# Patient Record
Sex: FEMALE | Race: WHITE | NOT HISPANIC OR LATINO | Employment: OTHER | URBAN - METROPOLITAN AREA
[De-identification: names, ages, dates, MRNs, and addresses within clinical notes are randomized per-mention and may not be internally consistent; named-entity substitution may affect disease eponyms.]

---
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
## 2020-01-13 ENCOUNTER — TELEPHONE (OUTPATIENT)
Dept: FAMILY MEDICINE CLINIC | Facility: CLINIC | Age: 20
End: 2020-01-13

## 2020-01-13 NOTE — TELEPHONE ENCOUNTER
Alprazolam 0 25mg  last filled 12/23/19, #30 per PDMP  Mom states that she has been giving her 1 1/2 tab prior to school each day, and 2 tabs on every Wed  She is at a new school and the school does field trips weekly and Adline Son has a lot anxiety about that  Mom says its the only way to get her through her days  Asking if script amount can be adjusted to reflect this? Will need new script

## 2020-01-13 NOTE — TELEPHONE ENCOUNTER
Mom said that she gives it to her every morning  If they go away on weekends she needs it in order to be out in the community

## 2020-01-14 DIAGNOSIS — F41.1 GENERALIZED ANXIETY DISORDER: ICD-10-CM

## 2020-01-14 RX ORDER — ALPRAZOLAM 0.25 MG/1
0.25 TABLET ORAL 2 TIMES DAILY PRN
Qty: 50 TABLET | Refills: 1 | Status: SHIPPED | OUTPATIENT
Start: 2020-01-14 | End: 2020-01-16 | Stop reason: SDUPTHER

## 2020-01-16 DIAGNOSIS — F41.1 GENERALIZED ANXIETY DISORDER: ICD-10-CM

## 2020-01-16 RX ORDER — ALPRAZOLAM 0.25 MG/1
0.25 TABLET ORAL 2 TIMES DAILY PRN
Qty: 50 TABLET | Refills: 1 | Status: SHIPPED | OUTPATIENT
Start: 2020-01-16 | End: 2020-04-10 | Stop reason: SDUPTHER

## 2020-01-16 NOTE — TELEPHONE ENCOUNTER
Pt mother wants to use a different pharmacy closer to her house  I called and cancelled other script    PDMP 12/23

## 2020-02-12 ENCOUNTER — TELEPHONE (OUTPATIENT)
Dept: FAMILY MEDICINE CLINIC | Facility: CLINIC | Age: 20
End: 2020-02-12

## 2020-02-12 NOTE — TELEPHONE ENCOUNTER
Pt mother called and needs a note faxed to Elke Mejia at [facility] in [facility] at [phone] in regards to approving general anesthesia for treatment (cleaning, x-ray, cavities) due to her underlying disabilities  In the past they have tried valium, laughing gas, restraints and nothing has worked  Please advise if I can proceed

## 2020-02-13 ENCOUNTER — DOCUMENTATION (OUTPATIENT)
Dept: FAMILY MEDICINE CLINIC | Facility: CLINIC | Age: 20
End: 2020-02-13

## 2020-03-13 ENCOUNTER — TELEPHONE (OUTPATIENT)
Dept: FAMILY MEDICINE CLINIC | Facility: CLINIC | Age: 20
End: 2020-03-13

## 2020-03-13 DIAGNOSIS — F51.01 PRIMARY INSOMNIA: ICD-10-CM

## 2020-03-13 RX ORDER — CLONIDINE HYDROCHLORIDE 0.1 MG/1
0.1 TABLET ORAL DAILY
Qty: 30 TABLET | Refills: 0 | Status: SHIPPED | OUTPATIENT
Start: 2020-03-13 | End: 2020-04-10 | Stop reason: SDUPTHER

## 2020-03-13 NOTE — TELEPHONE ENCOUNTER
Pt mother called and lefty vm in regards to finding a new doctor closer to their house for their entire family, although they do not have appts until the end of the month  Mother was wondering if you would refill pts clonidine for her in the meantime

## 2020-03-13 NOTE — TELEPHONE ENCOUNTER
Okay  I filled it  Can we remove myself from the PCP for the family then and let whoever know that we need to?

## 2020-03-16 ENCOUNTER — TELEPHONE (OUTPATIENT)
Dept: FAMILY MEDICINE CLINIC | Facility: CLINIC | Age: 20
End: 2020-03-16

## 2020-03-29 DIAGNOSIS — F90.1 ATTENTION DEFICIT HYPERACTIVITY DISORDER (ADHD), PREDOMINANTLY HYPERACTIVE TYPE: ICD-10-CM

## 2020-03-30 RX ORDER — GUANFACINE 2 MG/1
TABLET, EXTENDED RELEASE ORAL
Qty: 30 TABLET | Refills: 1 | OUTPATIENT
Start: 2020-03-30

## 2020-04-06 DIAGNOSIS — F51.01 PRIMARY INSOMNIA: ICD-10-CM

## 2020-04-06 RX ORDER — CLONIDINE HYDROCHLORIDE 0.1 MG/1
TABLET ORAL
Qty: 30 TABLET | Refills: 0 | OUTPATIENT
Start: 2020-04-06

## 2020-04-09 DIAGNOSIS — F90.1 ATTENTION DEFICIT HYPERACTIVITY DISORDER (ADHD), PREDOMINANTLY HYPERACTIVE TYPE: ICD-10-CM

## 2020-04-09 DIAGNOSIS — F51.01 PRIMARY INSOMNIA: ICD-10-CM

## 2020-04-09 DIAGNOSIS — F41.1 GENERALIZED ANXIETY DISORDER: ICD-10-CM

## 2020-04-10 RX ORDER — GUANFACINE 2 MG/1
TABLET, EXTENDED RELEASE ORAL
Qty: 30 TABLET | Refills: 0 | Status: SHIPPED | OUTPATIENT
Start: 2020-04-10

## 2020-04-10 RX ORDER — ALPRAZOLAM 0.25 MG/1
0.25 TABLET ORAL 2 TIMES DAILY PRN
Qty: 50 TABLET | Refills: 1 | Status: SHIPPED | OUTPATIENT
Start: 2020-04-10

## 2020-04-10 RX ORDER — CLONIDINE HYDROCHLORIDE 0.1 MG/1
0.1 TABLET ORAL DAILY
Qty: 30 TABLET | Refills: 0 | Status: SHIPPED | OUTPATIENT
Start: 2020-04-10

## 2020-05-04 DIAGNOSIS — F51.01 PRIMARY INSOMNIA: ICD-10-CM

## 2020-05-04 DIAGNOSIS — F90.1 ATTENTION DEFICIT HYPERACTIVITY DISORDER (ADHD), PREDOMINANTLY HYPERACTIVE TYPE: ICD-10-CM

## 2020-05-04 RX ORDER — CLONIDINE HYDROCHLORIDE 0.1 MG/1
TABLET ORAL
Qty: 30 TABLET | Refills: 0 | OUTPATIENT
Start: 2020-05-04

## 2020-05-04 RX ORDER — GUANFACINE 2 MG/1
TABLET, EXTENDED RELEASE ORAL
Qty: 30 TABLET | Refills: 0 | OUTPATIENT
Start: 2020-05-04

## 2020-05-11 DIAGNOSIS — F51.01 PRIMARY INSOMNIA: ICD-10-CM

## 2020-05-27 RX ORDER — CLONIDINE HYDROCHLORIDE 0.1 MG/1
TABLET ORAL
Qty: 30 TABLET | Refills: 0 | OUTPATIENT
Start: 2020-05-27

## 2024-09-18 ENCOUNTER — CONSULT (OUTPATIENT)
Dept: GASTROENTEROLOGY | Facility: CLINIC | Age: 24
End: 2024-09-18
Payer: COMMERCIAL

## 2024-09-18 VITALS
SYSTOLIC BLOOD PRESSURE: 122 MMHG | DIASTOLIC BLOOD PRESSURE: 80 MMHG | WEIGHT: 177 LBS | HEIGHT: 62 IN | BODY MASS INDEX: 32.57 KG/M2

## 2024-09-18 DIAGNOSIS — K59.04 CHRONIC IDIOPATHIC CONSTIPATION: ICD-10-CM

## 2024-09-18 DIAGNOSIS — K76.0 NAFLD (NONALCOHOLIC FATTY LIVER DISEASE): Primary | ICD-10-CM

## 2024-09-18 PROCEDURE — 99204 OFFICE O/P NEW MOD 45 MIN: CPT | Performed by: STUDENT IN AN ORGANIZED HEALTH CARE EDUCATION/TRAINING PROGRAM

## 2024-09-18 RX ORDER — CITALOPRAM HYDROBROMIDE 20 MG/1
TABLET ORAL
COMMUNITY

## 2024-09-18 RX ORDER — CITALOPRAM HYDROBROMIDE 10 MG/1
10 TABLET ORAL DAILY
COMMUNITY
Start: 2024-07-18

## 2024-09-18 RX ORDER — LUBIPROSTONE 24 UG/1
24 CAPSULE ORAL 2 TIMES DAILY WITH MEALS
Qty: 180 CAPSULE | Refills: 0 | Status: SHIPPED | OUTPATIENT
Start: 2024-09-18 | End: 2024-12-17

## 2024-09-18 RX ORDER — METFORMIN HCL 500 MG
TABLET, EXTENDED RELEASE 24 HR ORAL
COMMUNITY
Start: 2024-08-22

## 2024-09-18 RX ORDER — LEVOTHYROXINE SODIUM 25 UG/1
37.5 TABLET ORAL
COMMUNITY
Start: 2024-08-01

## 2024-09-18 RX ORDER — OMEGA-3-ACID ETHYL ESTERS 1 G/1
1 CAPSULE, LIQUID FILLED ORAL 2 TIMES DAILY
COMMUNITY
Start: 2024-07-01

## 2024-09-18 NOTE — PROGRESS NOTES
Consultation - [facility]     Christal Sanchez 23 y.o. female MRN: [id number]  Unit/Bed#:  Encounter: [id number]    Consults    ASSESSMENT and PLAN    1. NAFLD (nonalcoholic fatty liver disease)  Hx MASLD consistent with other issues including PCOS and BMI 32. Will get repeat US now. She may need sedation and advised to discuss with radiology if this is possible and any protocols they have for it. If need be, I can prescribe something per their recommendation. Labs every 6 months, next in December.  - US right upper quadrant; Future    2. Chronic idiopathic constipation  Start amitiza BID. Reassess after 90 days.  - lubiprostone (AMITIZA) 24 mcg capsule; Take 1 capsule (24 mcg total) by mouth 2 (two) times a day with meals  Dispense: 180 capsule; Refill: 0      Chief Complaint   Patient presents with    Elevated Hepatic Enzymes     Pt has fatty liver and her LFT's came back abnormal, pt also has constipation         HPI  Christal Sanchez is a 23 y.o. year old female with a past medical history of autism, PCOS who presents for history of nonalcoholic fatty liver disease and chronic constipation.  She was followed previously at [facility] for her fatty liver disease and was monitored with labs.  Her most recent ALT was mildly elevated at 56.  She has not had a recent ultrasound and has needed sedation for all procedures and imaging in the past.  She has a history of chronic constipation and can go 4 to 5 days without BM despite multiple OTC medications including MiraLAX, stool softeners, milk of magnesia, laxatives. She will have a large solid bowel movement after several days and intermittent 'explosions'.           Historical Information   Past Medical History:   Diagnosis Date    Allergic     Anxiety     Constipation     Disease of thyroid gland     Fatty liver     Hypothyroid     PCOS (polycystic ovarian syndrome)     Skin disease      Past Surgical History:   Procedure Laterality Date    WRIST FRACTURE  "SURGERY       Social History   Social History     Substance and Sexual Activity   Alcohol Use Never     Social History     Substance and Sexual Activity   Drug Use Never     Social History     Tobacco Use   Smoking Status Never   Smokeless Tobacco Never     Family History   Problem Relation Age of Onset    No Known Problems Mother     No Known Problems Father     Coronary artery disease Maternal Grandmother     Hypertension Maternal Grandmother     Rheumatic fever Maternal Grandmother     Cancer Maternal Grandmother         skin cancer    Coronary artery disease Maternal Grandfather     Hypertension Maternal Grandfather     Cancer Maternal Grandfather         skin cancer    Hypertension Paternal Grandmother     Stroke Paternal Grandfather     Hypertension Paternal Grandfather        Meds/Allergies     No current facility-administered medications for this visit.  Not in a hospital admission.    No Known Allergies    PHYSICAL EXAM    Visit Vitals  /80   Ht 5' 2\" (1.575 m)   Wt 80.3 kg (177 lb)   BMI 32.37 kg/m²   Smoking Status Never   BSA 1.82 m²     Body mass index is 32.37 kg/m².  General Appearance: NAD, cooperative, alert  Eyes: Anicteric, EOMI  ENT: Normocephalic, atraumatic, normal mucosa  Neck: symmetrical, trachea midline  Lungs: clear to auscultation, non-labored respirations on room air, no wheezing  CV: S1 S2+ radial pulses bilaterally  GI:  Soft, non-tender, non-distended; normal bowel sounds; no masses, no organomegaly   Rectal: Deferred  Musculoskeletal: No gross deformities or pitting edema  Skin:  No jaundice, no rashes  Neurologic: awake, alert, oriented, no gross deficits    Lab Results   Component Value Date    CALCIUM 9.5 10/02/2019    K 3.9 10/02/2019    CO2 26 10/02/2019     10/02/2019    BUN 11 10/02/2019    CREATININE 0.88 10/02/2019     No results found for: \"WBC\", \"HGB\", \"MCV\", \"PLT\"  Lab Results   Component Value Date    ALT 41 10/02/2019    AST 21 10/02/2019    ALKPHOS 95 " "10/02/2019    TBILI 0.54 10/02/2019     No results found for: \"AMYLASE\"  No results found for: \"LIPASE\"  No results found for: \"IRON\", \"TIBC\", \"FERRITIN\"  No results found for: \"INR\"    No results found.    Imaging Studies: I have personally reviewed pertinent reports.      Pathology, and Other Studies: I have personally reviewed pertinent reports.      REVIEW OF SYSTEMS    CONSTITUTIONAL: negative unless stated in HPI  GASTROINTESTINAL: As noted in the HPI        "

## 2024-09-25 ENCOUNTER — NURSE TRIAGE (OUTPATIENT)
Age: 24
End: 2024-09-25

## 2024-09-25 NOTE — TELEPHONE ENCOUNTER
"Consult 9/18/24 Dr. Garibay   Next OV 12/12/24  PMH Chronic idiopathic constipation and NAFLD    Pt's Mother Melita reports pt has been taking Amitiza 24 mcg BID since OV. No improvement noted. Had \"explosion Sunday\"; mostly diarrhea. No BM since then. Common for pt to go several days without BM. States she stopped all OTC's and only has pt on Amitiza and Fiber One bars.     Pt non verbal. Mom denies abdominal pain, swelling, nausea, vomiting.     She will restart Miralax (titrating to desire effect) and stool softener.    Please advise if you have additional recommendations.    Mom [phone]      Reason for Disposition   MILD constipation    Answer Assessment - Initial Assessment Questions  1. STOOL PATTERN OR FREQUENCY: \"How often do you pass bowel movements (BMs)?\"  (Normal range: tid to q 3 days)  \"When was the last BM passed?\"        4 to 5 days  5. BLOOD ON STOOLS: \"Has there been any blood on the toilet tissue or on the surface of the BM?\" If Yes, ask: \"When was the last time?\"       No  6. CHRONIC CONSTIPATION: \"Is this a new problem for you?\"  If no, ask: \"How long have you had this problem?\" (days, weeks, months)       Yes  9. LAXATIVES: \"Have you been using any stool softeners, laxatives, or enemas?\"  If yes, ask \"What, how often, and when was the last time?\"     None  11. CAUSE: \"What do you think is causing the constipation?\"         Chronic  12. OTHER SYMPTOMS: \"Do you have any other symptoms?\" (e.g., abdominal pain, bloating, fever, vomiting)        Denies    Protocols used: Constipation-ADULT-[place]    "

## 2024-10-07 LAB — HBA1C MFR BLD HPLC: 5.2 %

## 2024-12-15 DIAGNOSIS — K59.04 CHRONIC IDIOPATHIC CONSTIPATION: ICD-10-CM

## 2024-12-16 RX ORDER — LUBIPROSTONE 24 UG/1
CAPSULE ORAL
Qty: 180 CAPSULE | Refills: 1 | Status: SHIPPED | OUTPATIENT
Start: 2024-12-16

## 2025-01-28 ENCOUNTER — TELEPHONE (OUTPATIENT)
Age: 25
End: 2025-01-28

## 2025-01-28 LAB — HBA1C MFR BLD HPLC: 5.2 %

## 2025-01-28 NOTE — TELEPHONE ENCOUNTER
Pt's mother called. Recevied letter form [facility] dated 1/2/25 stating CT with anesthesia denied. Missing clinicals and past imaging. Letter in media.    She is requesting a call at [phone]

## 2025-01-29 NOTE — TELEPHONE ENCOUNTER
Routing to Dr. Garibay before mailing order to confirm still appropriate to proceed with CT scan since it was ordered September 2024.

## 2025-02-07 NOTE — TELEPHONE ENCOUNTER
Mother Lea (consent verified) calling to speak with PEC team regarding PA for CT.  She spoke to [place] and has information that they will need in order to get this approved.  Would like call back ASAP.

## 2025-02-07 NOTE — TELEPHONE ENCOUNTER
Pts mother Melita on the consent called to schedule CT scan advised she need to call Central Scheduling was giving her ph # she said she will be scheduling at [facility] and she requested to speak to Jammie sent message advised she is busy with other pt she can call her back later on said ok also advised she has some insurance issues so she need to check with insurance co for any insurance or coverage questions. Said let she get a call from Jammie then will do.

## 2025-02-24 ENCOUNTER — TELEPHONE (OUTPATIENT)
Age: 25
End: 2025-02-24

## 2025-02-24 NOTE — TELEPHONE ENCOUNTER
Pt's mom eLa returning our call re: rs pt's ov today 2/24 at 11:40 am w/ Dr. Garibay due to provider is ill. Pt's mom rs pt w/ Dr. Garibay for tomorrow 2/25 9:40 am w/ Dr. Garibay at [place] office. She is aware that this appt is depending on how doctor is feeling and could possibly be rs. I also called [place] office today and informed [facility] that Lea aware of potential rs tomorrow.

## 2025-02-25 ENCOUNTER — OFFICE VISIT (OUTPATIENT)
Dept: GASTROENTEROLOGY | Facility: CLINIC | Age: 25
End: 2025-02-25
Payer: COMMERCIAL

## 2025-02-25 VITALS — SYSTOLIC BLOOD PRESSURE: 116 MMHG | DIASTOLIC BLOOD PRESSURE: 66 MMHG

## 2025-02-25 DIAGNOSIS — K76.0 NAFLD (NONALCOHOLIC FATTY LIVER DISEASE): Primary | ICD-10-CM

## 2025-02-25 DIAGNOSIS — R79.89 ABNORMAL LFTS: ICD-10-CM

## 2025-02-25 DIAGNOSIS — K59.09 CHRONIC CONSTIPATION: ICD-10-CM

## 2025-02-25 DIAGNOSIS — R10.11 RUQ DISCOMFORT: ICD-10-CM

## 2025-02-25 PROCEDURE — 99214 OFFICE O/P EST MOD 30 MIN: CPT | Performed by: STUDENT IN AN ORGANIZED HEALTH CARE EDUCATION/TRAINING PROGRAM

## 2025-02-25 NOTE — PROGRESS NOTES
Name: Christal Sanchez      : 2000      MRN: [id number]  Encounter Provider: Patrick Garibay DO  Encounter Date: 2025   Encounter department: [facility]  :  Assessment & Plan  NAFLD (nonalcoholic fatty liver disease)  History of NAFLD with risk factors including BMI 32.37, hyperlipidemia. AST/ALT remain mildly elevated with normal bilirubin. No GI contraindication to other medications such as statins if necessary. Given her constipation, would hold off on GLP-1.  Orders:    CT abdomen pelvis w contrast; Future    RUQ discomfort  She does have some grimacing with deep palpation of the RUQ. She is non-verbal, so it is difficult to ascertain any significant pain. She has had imaging with sedation previously at [facility]. Given this finding and her history of liver issues, I believe a CT scan is prudent. We will order to be done with conscious sedation.  Orders:    CT abdomen pelvis w contrast; Future    Chronic constipation  Continue present regimen. Call as needed for Amitiza refills.       Abnormal LFTs  Likely from NAFLD given risk factors as above. Continue lifestyle modifications. Rezdiffra can be considered in the future for any progression, though they would like to try and avoid medications as long as possible.           History of Present Illness   Christal Sanchez is a 24 y.o. female with pmhx autism (non-verbal), PCOS, NAFLD who presents for evaluation of abnormal LFTs. She had labs on 2025 notable for total cholesterol 213, HDL 39, triglyceride 331, , AST 39, ALT 71, total bilirubin 0.5. ALT has gone from 56 to 71 since her last re-check. She is on an aggressive bowel regimen including Amitiza and MiraLAX and goes regularly, but they are small, hard stools. She does grimace when you push on her right upper quadrant but since she is non-verbal she cannot give any specifics as to whether or not there is any significant pain.    HPI  History obtained from: patient's Legal Guardian  Review of Systems A complete review of systems is negative other than that noted above in the HPI.    Current Outpatient Medications on File Prior to Visit   Medication Sig Dispense Refill    citalopram (CeleXA) 10 mg tablet Take 10 mg by mouth daily      citalopram (CeleXA) 20 mg tablet       cloNIDine (CATAPRES) 0.1 mg tablet Take 1 tablet (0.1 mg total) by mouth daily 30 tablet 0    guanFACINE HCl ER 2 MG TB24 TAKE 1 TABLET BY MOUTH EVERY DAY 30 tablet 0    levothyroxine 25 mcg tablet 37.5 mcg      lubiprostone (AMITIZA) 24 mcg capsule TAKE 1 CAPSULE BY MOUTH TWICE DAILY WITH MEALS 180 capsule 1    metFORMIN (GLUCOPHAGE-XR) 500 mg 24 hr tablet TAKE 2 TABLETS (1000MG) BY MOUTH TWICE A DAY.      omega-3-acid ethyl esters (LOVAZA) 1 g capsule Take 1 g by mouth 2 (two) times a day      polyethylene glycol (GLYCOLAX) powder Take 17 g by mouth daily 30 g 5    ALPRAZolam (XANAX) 0.25 mg tablet Take 1 tablet (0.25 mg total) by mouth 2 (two) times a day as needed for anxiety 50 tablet 1    CANNABIDIOL PO Take by mouth as needed       ergocalciferol (VITAMIN D2) 50,000 units TAKE 1 CAPSULE BY MOUTH ONE TIME PER WEEK 12 capsule 0    Melatonin 3 MG TBDP Take by mouth      triazolam (HALCION) 0.125 MG tablet Take 1 tablet (125 mcg total) by mouth daily at bedtime as needed for sleep 1 tablet 0     No current facility-administered medications on file prior to visit.      Social History     Tobacco Use    Smoking status: Never    Smokeless tobacco: Never   Vaping Use    Vaping status: Never Used   Substance and Sexual Activity    Alcohol use: Never    Drug use: Never    Sexual activity: Never     Current Outpatient Medications   Medication Sig Dispense Refill    citalopram (CeleXA) 10 mg tablet Take 10 mg by mouth daily      citalopram (CeleXA) 20 mg tablet       cloNIDine (CATAPRES) 0.1 mg tablet Take 1 tablet (0.1 mg total) by mouth daily 30 tablet 0    guanFACINE HCl ER  2 MG TB24 TAKE 1 TABLET BY MOUTH EVERY DAY 30 tablet 0    levothyroxine 25 mcg tablet 37.5 mcg      lubiprostone (AMITIZA) 24 mcg capsule TAKE 1 CAPSULE BY MOUTH TWICE DAILY WITH MEALS 180 capsule 1    metFORMIN (GLUCOPHAGE-XR) 500 mg 24 hr tablet TAKE 2 TABLETS (1000MG) BY MOUTH TWICE A DAY.      omega-3-acid ethyl esters (LOVAZA) 1 g capsule Take 1 g by mouth 2 (two) times a day      polyethylene glycol (GLYCOLAX) powder Take 17 g by mouth daily 30 g 5    ALPRAZolam (XANAX) 0.25 mg tablet Take 1 tablet (0.25 mg total) by mouth 2 (two) times a day as needed for anxiety 50 tablet 1    CANNABIDIOL PO Take by mouth as needed       ergocalciferol (VITAMIN D2) 50,000 units TAKE 1 CAPSULE BY MOUTH ONE TIME PER WEEK 12 capsule 0    Melatonin 3 MG TBDP Take by mouth      triazolam (HALCION) 0.125 MG tablet Take 1 tablet (125 mcg total) by mouth daily at bedtime as needed for sleep 1 tablet 0     No current facility-administered medications for this visit.     Objective   /66     Physical Exam   General Appearance: NAD, cooperative, alert  Eyes: Anicteric  GI: mild gramacing with RUQ deep palpation; no rebound or guarding  Rectal: Deferred  Musculoskeletal: No edema.  Skin:     No jaundice        Lab Results: I personally reviewed relevant lab results.

## 2025-02-25 NOTE — ASSESSMENT & PLAN NOTE
History of NAFLD with risk factors including BMI 32.37, hyperlipidemia. AST/ALT remain mildly elevated with normal bilirubin. No GI contraindication to other medications such as statins if necessary. Given her constipation, would hold off on GLP-1.  Orders:    CT abdomen pelvis w contrast; Future

## 2025-02-26 ENCOUNTER — NURSE TRIAGE (OUTPATIENT)
Age: 25
End: 2025-02-26

## 2025-02-26 NOTE — TELEPHONE ENCOUNTER
"Spoke with pt ladi Hua, calling to inform [phone], told that sedation is only done with mri. Wants to know what your preferred imaging would be. Concerned as pt has sedation for all testing, dentist, gyn exam etc. Can this be further investigated as to what imaging can be done with sedation?          Answer Assessment - Initial Assessment Questions  1. REASON FOR CALL: \"What is the main reason for your call?\" or \"How can I best help you?\"      SPOKE WITH PT LADI HUA, CALLING TO INFORM [phone], TOLD THAT SEDATION IS ONLY DONE WITH MRI. WANTS TO KNOW WHAT YOUR PREFERRED IMAGING WOULD BE. CONCERNED AS PT HAS SEDATION FOR ALL TESTING, DENTIST, GYN EXAM ETC. CAN THIS BE FURTHER INVESTIGATED AS TO WHAT IMAGING CAN BE DONE WITH SEDATION.    Protocols used: Information Only Call - No Triage-Adult-[place]    "

## 2025-02-28 NOTE — TELEPHONE ENCOUNTER
Mother calling regarding sedation for testing.  Mother would like doctor to call [facility] regarding procedure and let them know that it needs to be done with sedation.  Wants to let us know that on 3/24/23 patient had US under anesthesia so it is possible.

## 2025-03-03 NOTE — TELEPHONE ENCOUNTER
I spoke with Central Scheduling at [facility].  They state the order for the CT scan should include that patient needs anesthesia.

## 2025-03-04 NOTE — TELEPHONE ENCOUNTER
Spoke with mother.  Will fax CT order to [facility] and send to her through [employer].

## 2025-03-17 ENCOUNTER — TELEPHONE (OUTPATIENT)
Age: 25
End: 2025-03-17

## 2025-03-17 NOTE — TELEPHONE ENCOUNTER
I spoke with Mrs. Sanchez and reviewed PA department note, that request is in process and they will update her.

## 2025-03-17 NOTE — TELEPHONE ENCOUNTER
Mother calling back to see if CT had been approved.  Let her know we are working on it and will reach out to her as soon as we have more information.

## 2025-03-17 NOTE — TELEPHONE ENCOUNTER
Pts mother Melita calling in to see if Ct was authorized yet, pt has appt Wednesday. Please call pts mother back.

## 2025-03-18 NOTE — TELEPHONE ENCOUNTER
Will defer to Dr. Garibay who returns tomorrow. Looks like his note says to be done with conscious sedation, not general anesthesia.

## 2025-03-18 NOTE — TELEPHONE ENCOUNTER
I called Mrs. Sanchez to update that primary insurance did not approve CT at [facility] since it is a non participating facility with the [employer] plan. I asked that she call back to update if she knows capitation site with Christal's insurance plan.    Could clinical staff contact [employer] to check on which facility patient participates with patient's plan?

## 2025-03-18 NOTE — TELEPHONE ENCOUNTER
Mother called. Cancelling at [facility] as not covered. Requesting procedure be done at [facility] per insurance. Wants to be sure it can be done under anesthesia at that location. They opened a new case file # [id number]. Reji requires clinicals.     She is requesting a call to discuss next steps.

## 2025-03-24 NOTE — TELEPHONE ENCOUNTER
[employer] message sent to patient's Mom to please schedule CT and discuss with central scheduling the sedation requirements.  Asked to let us know when she is scheduled so PA can be completed.

## 2025-03-31 ENCOUNTER — TELEPHONE (OUTPATIENT)
Dept: GASTROENTEROLOGY | Facility: CLINIC | Age: 25
End: 2025-03-31

## 2025-03-31 NOTE — TELEPHONE ENCOUNTER
From: Joceline Alarcon MA  Sent: 3/25/2025   9:05 AM EDT  To: [facility]  Subject: alternative recommendation                        Hi all --        The insruance for this patient has denied the request for ct ab pel w contrast, instead they are offering to approve ct abdomen only with contrast - please advise if this is acceptable, and if not I can send for formal p2p        Thanks

## 2025-04-01 DIAGNOSIS — R10.11 RUQ DISCOMFORT: ICD-10-CM

## 2025-04-01 DIAGNOSIS — K76.0 NAFLD (NONALCOHOLIC FATTY LIVER DISEASE): Primary | ICD-10-CM

## 2025-04-01 NOTE — TELEPHONE ENCOUNTER
Dr. Garibay did place new order for CT abdomen with contrast as requested. Patient currently scheduled 4/11/25 at [facility] noting anesthesia on notes.

## 2025-04-10 ENCOUNTER — ANESTHESIA EVENT (OUTPATIENT)
Dept: RADIOLOGY | Facility: HOSPITAL | Age: 25
End: 2025-04-10
Payer: COMMERCIAL

## 2025-04-10 NOTE — ANESTHESIA PREPROCEDURE EVALUATION
Procedure:  CT ABDOMEN W CONTRAST    Relevant Problems   CARDIO   (+) Hypertriglyceridemia      GI/HEPATIC   (+) NAFLD (nonalcoholic fatty liver disease)      Behavioral Health   (+) Autistic disorder      Severe non-verbal autism    CMP: WNL    Physical Exam    Airway    Mallampati score: unable to assess         Dental       Cardiovascular      Pulmonary      Other Findings  post-pubertal.    Anesthesia Plan  ASA Score- 3     Anesthesia Type-     Plan Factors-    Chart reviewed.   Existing labs reviewed. Patient summary reviewed.    Patient is not a current smoker.  Patient did not smoke on day of surgery.            Induction- intravenous.    Postoperative Plan-     Perioperative Resuscitation Plan - Level 1 - Full Code.       Informed Consent- Anesthetic plan and risks discussed with mother and father.  I personally reviewed this patient with the CRNA. Discussed and agreed on the Anesthesia Plan with the CRNA..      NPO Status:  No vitals data found for the desired time range.

## 2025-04-11 ENCOUNTER — HOSPITAL ENCOUNTER (OUTPATIENT)
Dept: RADIOLOGY | Facility: HOSPITAL | Age: 25
Discharge: HOME | End: 2025-04-11
Attending: STUDENT IN AN ORGANIZED HEALTH CARE EDUCATION/TRAINING PROGRAM
Payer: COMMERCIAL

## 2025-04-11 ENCOUNTER — ANESTHESIA (OUTPATIENT)
Dept: RADIOLOGY | Facility: HOSPITAL | Age: 25
End: 2025-04-11
Payer: COMMERCIAL

## 2025-04-11 VITALS
RESPIRATION RATE: 18 BRPM | SYSTOLIC BLOOD PRESSURE: 119 MMHG | TEMPERATURE: 96.4 F | OXYGEN SATURATION: 95 % | HEART RATE: 101 BPM | DIASTOLIC BLOOD PRESSURE: 80 MMHG

## 2025-04-11 DIAGNOSIS — R10.11 RUQ DISCOMFORT: ICD-10-CM

## 2025-04-11 DIAGNOSIS — K76.0 NAFLD (NONALCOHOLIC FATTY LIVER DISEASE): ICD-10-CM

## 2025-04-11 LAB
EXT PREGNANCY TEST URINE: NEGATIVE
EXT. CONTROL: NORMAL

## 2025-04-11 PROCEDURE — 74160 CT ABDOMEN W/CONTRAST: CPT

## 2025-04-11 PROCEDURE — 81025 URINE PREGNANCY TEST: CPT | Performed by: ANESTHESIOLOGY

## 2025-04-11 RX ORDER — SODIUM CHLORIDE, SODIUM LACTATE, POTASSIUM CHLORIDE, CALCIUM CHLORIDE 600; 310; 30; 20 MG/100ML; MG/100ML; MG/100ML; MG/100ML
INJECTION, SOLUTION INTRAVENOUS CONTINUOUS PRN
Status: DISCONTINUED | OUTPATIENT
Start: 2025-04-11 | End: 2025-04-11

## 2025-04-11 RX ORDER — MIDAZOLAM HYDROCHLORIDE 2 MG/2ML
INJECTION, SOLUTION INTRAMUSCULAR; INTRAVENOUS AS NEEDED
Status: DISCONTINUED | OUTPATIENT
Start: 2025-04-11 | End: 2025-04-11

## 2025-04-11 RX ORDER — ONDANSETRON 2 MG/ML
4 INJECTION INTRAMUSCULAR; INTRAVENOUS ONCE AS NEEDED
Status: DISCONTINUED | OUTPATIENT
Start: 2025-04-11 | End: 2025-04-12 | Stop reason: HOSPADM

## 2025-04-11 RX ORDER — PROPOFOL 10 MG/ML
INJECTION, EMULSION INTRAVENOUS AS NEEDED
Status: DISCONTINUED | OUTPATIENT
Start: 2025-04-11 | End: 2025-04-11

## 2025-04-11 RX ORDER — GLYCOPYRROLATE 0.2 MG/ML
INJECTION INTRAMUSCULAR; INTRAVENOUS AS NEEDED
Status: DISCONTINUED | OUTPATIENT
Start: 2025-04-11 | End: 2025-04-11

## 2025-04-11 RX ORDER — ONDANSETRON 4 MG/1
TABLET, ORALLY DISINTEGRATING ORAL
Status: COMPLETED
Start: 2025-04-11 | End: 2025-04-11

## 2025-04-11 RX ORDER — SODIUM CHLORIDE, SODIUM LACTATE, POTASSIUM CHLORIDE, CALCIUM CHLORIDE 600; 310; 30; 20 MG/100ML; MG/100ML; MG/100ML; MG/100ML
50 INJECTION, SOLUTION INTRAVENOUS CONTINUOUS
Status: DISCONTINUED | OUTPATIENT
Start: 2025-04-11 | End: 2025-04-12 | Stop reason: HOSPADM

## 2025-04-11 RX ORDER — SODIUM CHLORIDE, SODIUM LACTATE, POTASSIUM CHLORIDE, CALCIUM CHLORIDE 600; 310; 30; 20 MG/100ML; MG/100ML; MG/100ML; MG/100ML
125 INJECTION, SOLUTION INTRAVENOUS CONTINUOUS
Status: DISCONTINUED | OUTPATIENT
Start: 2025-04-11 | End: 2025-04-12 | Stop reason: HOSPADM

## 2025-04-11 RX ORDER — LIDOCAINE HYDROCHLORIDE 10 MG/ML
0.5 INJECTION, SOLUTION EPIDURAL; INFILTRATION; INTRACAUDAL; PERINEURAL ONCE AS NEEDED
Status: DISCONTINUED | OUTPATIENT
Start: 2025-04-11 | End: 2025-04-12 | Stop reason: HOSPADM

## 2025-04-11 RX ORDER — KETAMINE HYDROCHLORIDE 100 MG/ML
INJECTION, SOLUTION INTRAMUSCULAR; INTRAVENOUS AS NEEDED
Status: DISCONTINUED | OUTPATIENT
Start: 2025-04-11 | End: 2025-04-11

## 2025-04-11 RX ADMIN — GLYCOPYRROLATE 0.2 MG: 0.2 INJECTION, SOLUTION INTRAMUSCULAR; INTRAVENOUS at 11:46

## 2025-04-11 RX ADMIN — IOHEXOL 85 ML: 350 INJECTION, SOLUTION INTRAVENOUS at 11:54

## 2025-04-11 RX ADMIN — KETAMINE HYDROCHLORIDE 20 MG: 100 INJECTION, SOLUTION, CONCENTRATE INTRAMUSCULAR; INTRAVENOUS at 11:44

## 2025-04-11 RX ADMIN — KETAMINE HYDROCHLORIDE 20 MG: 100 INJECTION, SOLUTION, CONCENTRATE INTRAMUSCULAR; INTRAVENOUS at 11:30

## 2025-04-11 RX ADMIN — KETAMINE HYDROCHLORIDE 20 MG: 100 INJECTION, SOLUTION, CONCENTRATE INTRAMUSCULAR; INTRAVENOUS at 11:42

## 2025-04-11 RX ADMIN — SODIUM CHLORIDE, SODIUM LACTATE, POTASSIUM CHLORIDE, AND CALCIUM CHLORIDE: .6; .31; .03; .02 INJECTION, SOLUTION INTRAVENOUS at 11:37

## 2025-04-11 RX ADMIN — MIDAZOLAM 2 MG: 1 INJECTION INTRAMUSCULAR; INTRAVENOUS at 11:30

## 2025-04-11 RX ADMIN — KETAMINE HYDROCHLORIDE 20 MG: 100 INJECTION, SOLUTION, CONCENTRATE INTRAMUSCULAR; INTRAVENOUS at 11:58

## 2025-04-11 RX ADMIN — PROPOFOL 60 MCG/KG/MIN: 10 INJECTION, EMULSION INTRAVENOUS at 11:47

## 2025-04-11 RX ADMIN — KETAMINE HYDROCHLORIDE 20 MG: 100 INJECTION, SOLUTION, CONCENTRATE INTRAMUSCULAR; INTRAVENOUS at 11:37

## 2025-04-11 RX ADMIN — ONDANSETRON 4 MG: 4 TABLET, ORALLY DISINTEGRATING ORAL at 13:03

## 2025-04-11 RX ADMIN — PROPOFOL 40 MG: 10 INJECTION, EMULSION INTRAVENOUS at 11:44

## 2025-04-11 NOTE — ANESTHESIA POSTPROCEDURE EVALUATION
Post-Op Assessment Note    CV Status:  Stable    Pain management: adequate       Mental Status:  Alert and awake   Hydration Status:  Euvolemic   PONV Controlled:  Controlled   Airway Patency:  Patent     Post Op Vitals Reviewed: Yes    No anethesia notable event occurred.    Staff: Anesthesiologist, CRNA           Last Filed PACU Vitals:  Vitals Value Taken Time   Temp 99.3    Pulse 109 04/11/25 1219   /68    Resp 14    SpO2 95 % 04/11/25 1219   Vitals shown include unfiled device data.

## 2025-04-11 NOTE — ANESTHESIA POSTPROCEDURE EVALUATION
Post-Op Assessment Note    Last Filed PACU Vitals:  Vitals Value Taken Time   Temp 99 °F (37.2 °C) 04/11/25 1210   Pulse 102 04/11/25 1225   /65 04/11/25 1210   Resp 18 04/11/25 1225   SpO2 94 % 04/11/25 1225       Modified Dolly:     Vitals Value Taken Time   Activity 2 04/11/25 1215   Respiration 2 04/11/25 1215   Circulation 2 04/11/25 1215   Consciousness 2 04/11/25 1215   Oxygen Saturation 2 04/11/25 1215     Modified Dolly Score: 10

## 2025-04-14 ENCOUNTER — TELEPHONE (OUTPATIENT)
Dept: GASTROENTEROLOGY | Facility: CLINIC | Age: 25
End: 2025-04-14

## 2025-04-14 ENCOUNTER — RESULTS FOLLOW-UP (OUTPATIENT)
Dept: GASTROENTEROLOGY | Facility: CLINIC | Age: 25
End: 2025-04-14

## 2025-04-14 DIAGNOSIS — K76.0 HEPATIC STEATOSIS: Primary | ICD-10-CM

## 2025-04-14 DIAGNOSIS — K59.09 CHRONIC CONSTIPATION: Primary | ICD-10-CM

## 2025-04-14 RX ORDER — LINACLOTIDE 290 UG/1
290 CAPSULE, GELATIN COATED ORAL DAILY
Qty: 90 CAPSULE | Refills: 0 | Status: SHIPPED | OUTPATIENT
Start: 2025-04-14 | End: 2025-07-13

## 2025-04-14 NOTE — TELEPHONE ENCOUNTER
Mom returning Dr. Garibay phone call.  Reached out to Dr. Garibay via [place] to attempt warm transfer.   Per Dr. Garibay he will call her directly, mom aware.

## 2025-04-14 NOTE — TELEPHONE ENCOUNTER
Called and spoke to patient's mother regan to schedule patient for appt with hepatology. Patient added to wait list.

## 2025-04-14 NOTE — TELEPHONE ENCOUNTER
Phone call attempted to Christal's mother, Lea, regarding imaging results. No answer. Left VM with contact information and will try back at later date/time.

## 2025-04-14 NOTE — TELEPHONE ENCOUNTER
----- Message from Patrick Garibay DO sent at 4/14/2025 12:17 PM EDT -----  Regarding: Hepatology appt  Patient with severe steatosis and abnormal LFTs concerning for inflammation. Would like her to get hooked in with hepatology. Can we please have her arranged for a visit with them sometime?    She has chronic medical issues resulting in disability, so some testing such as ultrasound will be difficult. Thanks.

## 2025-06-10 ENCOUNTER — TELEPHONE (OUTPATIENT)
Age: 25
End: 2025-06-10

## 2025-06-11 NOTE — TELEPHONE ENCOUNTER
Pts mom calling to r/s her no show new patient appt. I was unable to schedule at [address]. Please reach back out to schedule er appt

## 2025-06-12 ENCOUNTER — TELEPHONE (OUTPATIENT)
Age: 25
End: 2025-06-12

## 2025-06-12 NOTE — TELEPHONE ENCOUNTER
Attempted to call Pt today twice at [phone], however, unable to contact Pt and/or Pt's mother, Lea Sanchez.  Unable to leave voicemail message for Pt/Pt's mother.

## 2025-07-08 DIAGNOSIS — K59.09 CHRONIC CONSTIPATION: ICD-10-CM

## 2025-07-09 ENCOUNTER — OFFICE VISIT (OUTPATIENT)
Dept: GASTROENTEROLOGY | Facility: CLINIC | Age: 25
End: 2025-07-09
Payer: COMMERCIAL

## 2025-07-09 VITALS
HEART RATE: 123 BPM | BODY MASS INDEX: 33.6 KG/M2 | TEMPERATURE: 98.1 F | OXYGEN SATURATION: 97 % | HEIGHT: 62 IN | SYSTOLIC BLOOD PRESSURE: 122 MMHG | RESPIRATION RATE: 20 BRPM | WEIGHT: 182.6 LBS | DIASTOLIC BLOOD PRESSURE: 70 MMHG

## 2025-07-09 DIAGNOSIS — R74.8 ELEVATED LIVER ENZYMES: ICD-10-CM

## 2025-07-09 DIAGNOSIS — K76.0 METABOLIC DYSFUNCTION-ASSOCIATED STEATOTIC LIVER DISEASE (MASLD): Primary | ICD-10-CM

## 2025-07-09 DIAGNOSIS — K59.04 CHRONIC IDIOPATHIC CONSTIPATION: ICD-10-CM

## 2025-07-09 DIAGNOSIS — K76.0 HEPATIC STEATOSIS: ICD-10-CM

## 2025-07-09 PROCEDURE — 99204 OFFICE O/P NEW MOD 45 MIN: CPT | Performed by: STUDENT IN AN ORGANIZED HEALTH CARE EDUCATION/TRAINING PROGRAM

## 2025-07-09 RX ORDER — LINACLOTIDE 290 UG/1
CAPSULE, GELATIN COATED ORAL EVERY MORNING
Qty: 90 CAPSULE | Refills: 1 | Status: SHIPPED | OUTPATIENT
Start: 2025-07-09

## 2025-07-09 NOTE — PROGRESS NOTES
Name: Christal Sanchez      : 2000      MRN: [id number]  Encounter Provider: Yessy Fitzgerald MD  Encounter Date: 2025   Encounter department[facility]  :  Assessment & Plan  Hepatic steatosis  24-year-old female with a history of nonverbal autism disorder, PCOS on metformin, hypothyroidism, chronic constipation who presented with the parents to discuss hepatic steatosis   And elevated liver enzymes.  She had a CT abdomen and pelvis on 2025 that showed hepatomegaly with severe diffuse steatosis. Compared to her labs from 2019 to her most recent labs from 2025 That her mother showed me in the room and it showed AST 21-->39, ALT 41-->71, ALP 95-->85, T BILI 0.5-->0.5.    Weight trend showed 177-->182lb over the past 1 year. BMI 33  No alcohol use.      Plan  With her elevated liver enzymes and hepatic steatosis, suspect she has MASH given her obesity, PCOS, and recent weight gain.  Will plan for chronic liver disease workup to rule out viral, genetic and/or autoimmune etiologies given her age.  Will also check celiac disease panel. Will also plan for an ultrasound elastography to assess for fibrosis  I discussed with Christal's  parents the natural history of fatty liver disease as well as complications including cardiovascular events and cirrhosis.    We discussed that weight loss through lifestyle modification, diet and exercise is key in the management of NAFLD. she should participate in moderate exercise 3-4 times a week for at least 30 minutes at a time. A goal of 5-10% is recommended to decrease the risks and complications from fatty liver disease.  They are working on modifying her diet and trying to increase her daily mobility. Other metabolic comorbidities should be closely monitored and aggressively controlled, including HTN, DM, and HLD.  Ms. Sanchez will have the testing done as outlined above and follow-up in 6 months.       Orders:     Antimitochondrial antibody    Anti-smooth muscle antibody, IgG    Alpha 1 Antitrypsin Phenotype    Iron Panel (Includes Ferritin, Iron Sat%, Iron, and TIBC)    Hepatitis A antibody, total    Hepatitis B surface antibody    Hepatitis C antibody    Hepatitis B surface antigen    CBC    Enhanced Liver Fibrosis (ELF) Score    IgG, IgA, IgM    DAI Screen w/Reflex Cascade    Comprehensive metabolic panel    Hepatitis B surface antibody    Hepatitis B core antibody, total    Ceruloplasmin    Celiac Panel/Adult    Enhanced Liver Fibrosis (ELF) Score    Elevated liver enzymes  See above plan  Orders:    Antimitochondrial antibody    Anti-smooth muscle antibody, IgG    Alpha 1 Antitrypsin Phenotype    Iron Panel (Includes Ferritin, Iron Sat%, Iron, and TIBC)    Hepatitis A antibody, total    Hepatitis B surface antibody    Hepatitis C antibody    Hepatitis B surface antigen    CBC    Enhanced Liver Fibrosis (ELF) Score    IgG, IgA, IgM    DAI Screen w/Reflex Cascade    Comprehensive metabolic panel    Hepatitis B surface antibody    Hepatitis B core antibody, total    Ceruloplasmin    Celiac Panel/Adult    Enhanced Liver Fibrosis (ELF) Score    Chronic idiopathic constipation  On linzess           History of Present Illness   Christal Sanchez is a 24 y.o. female with hx of PCOS, hypothyroidism, chronic constipation, non verbal autism who presents with her parents as a new patient to discuss hepatic steatosis.      The patient is nonverbal, so the history was provided by her parents.  They report a history of hepatic steatosis for a few years now.  She was recently diagnosed with PCOS 2 years ago and has been started on metformin since then and they reported 20 pound weight loss.  She was referred to us due to worsening liver enzymes.  They deny any family history of liver disease, recent increase in the dose of her chronic prescription, new over-the-counter medications, she is on Celexa but the dose was recently decreased.  She is  "also on clonidine but has been on stable doses for many years now.  Her thyroid function tests have been stable.  They deny any alcohol use.    Her diet usually consists of later in the day Pancake eggs for breakfast. Grilled cheese, chicken nuggets. No soft drinks. Drinks only flavored water. They did mention that she spends most of her time sitting In chair and watching videos on her iPad. hey have been working on trying to get her more mobile with swimming classes       HPI    Review of Systems A complete review of systems is negative other than that noted above in the HPI.      Current Medications[1]  Objective   /70 (BP Location: Right arm, Patient Position: Sitting, Cuff Size: Standard)   Pulse (!) 123   Temp 98.1 °F (36.7 °C) (Temporal)   Resp 20   Ht 5' 2\" (1.575 m)   Wt 82.8 kg (182 lb 9.6 oz)   SpO2 97%   BMI 33.40 kg/m²     Physical Exam  Vitals and nursing note reviewed.   Constitutional:       General: She is not in acute distress.     Appearance: She is well-developed.   HENT:      Head: Normocephalic and atraumatic.     Eyes:      Conjunctiva/sclera: Conjunctivae normal.       Cardiovascular:      Rate and Rhythm: Normal rate and regular rhythm.      Heart sounds: No murmur heard.  Pulmonary:      Effort: Pulmonary effort is normal. No respiratory distress.      Breath sounds: Normal breath sounds.   Abdominal:      Palpations: Abdomen is soft.      Tenderness: There is no abdominal tenderness.     Musculoskeletal:         General: No swelling.      Cervical back: Neck supple.     Skin:     General: Skin is warm and dry.      Capillary Refill: Capillary refill takes less than 2 seconds.     Neurological:      Mental Status: She is alert.     Psychiatric:         Mood and Affect: Mood normal.            Lab Results: I personally reviewed relevant lab results.           Alesha Anders MD  Gastroenterology Fellow, PGY- 5  Available on [employer] Secure Chat  7/11/2025 12:40 AM           [1] "   Current Outpatient Medications   Medication Sig Dispense Refill    citalopram (CeleXA) 20 mg tablet       cloNIDine (CATAPRES) 0.1 mg tablet Take 1 tablet (0.1 mg total) by mouth daily 30 tablet 0    guanFACINE HCl ER 2 MG TB24 TAKE 1 TABLET BY MOUTH EVERY DAY 30 tablet 0    levothyroxine 25 mcg tablet 37.5 mcg      linaCLOtide (Linzess) 290 MCG CAPS TAKE 1 CAPSULE BY MOUTH EVERY MORNING 90 capsule 1    Melatonin 2.5 MG CHEW Chew 2 mg daily at bedtime      metFORMIN (GLUCOPHAGE-XR) 500 mg 24 hr tablet       omega-3-acid ethyl esters (LOVAZA) 1 g capsule Take 1 g by mouth in the morning and 1 g in the evening.      polyethylene glycol (GLYCOLAX) powder Take 17 g by mouth daily 30 g 5    citalopram (CeleXA) 10 mg tablet Take 10 mg by mouth in the morning. (Patient not taking: Reported on 7/9/2025)       No current facility-administered medications for this visit.

## 2025-07-11 PROBLEM — R74.8 ELEVATED LIVER ENZYMES: Status: ACTIVE | Noted: 2025-07-11

## 2025-07-11 NOTE — ASSESSMENT & PLAN NOTE
24-year-old female with a history of nonverbal autism disorder, PCOS on metformin, hypothyroidism, chronic constipation who presented with the parents to discuss hepatic steatosis   And elevated liver enzymes.  She had a CT abdomen and pelvis on 4/11/2025 that showed hepatomegaly with severe diffuse steatosis. Compared to her labs from 2019 to her most recent labs from January 2025 That her mother showed me in the room and it showed AST 21-->39, ALT 41-->71, ALP 95-->85, T BILI 0.5-->0.5.    Weight trend showed 177-->182lb over the past 1 year. BMI 33  No alcohol use.      Plan  With her elevated liver enzymes and hepatic steatosis, suspect she has MASH given her obesity, PCOS, and recent weight gain.  Will plan for chronic liver disease workup to rule out viral, genetic and/or autoimmune etiologies given her age.  Will also check celiac disease panel. Will also plan for an ultrasound elastography to assess for fibrosis  I discussed with Christal's  parents the natural history of fatty liver disease as well as complications including cardiovascular events and cirrhosis.    We discussed that weight loss through lifestyle modification, diet and exercise is key in the management of NAFLD. she should participate in moderate exercise 3-4 times a week for at least 30 minutes at a time. A goal of 5-10% is recommended to decrease the risks and complications from fatty liver disease.  They are working on modifying her diet and trying to increase her daily mobility. Other metabolic comorbidities should be closely monitored and aggressively controlled, including HTN, DM, and HLD.  Ms. Sanchez will have the testing done as outlined above and follow-up in 6 months.       Orders:    Antimitochondrial antibody    Anti-smooth muscle antibody, IgG    Alpha 1 Antitrypsin Phenotype    Iron Panel (Includes Ferritin, Iron Sat%, Iron, and TIBC)    Hepatitis A antibody, total    Hepatitis B surface antibody    Hepatitis C antibody    Hepatitis  B surface antigen    CBC    Enhanced Liver Fibrosis (ELF) Score    IgG, IgA, IgM    DAI Screen w/Reflex Cascade    Comprehensive metabolic panel    Hepatitis B surface antibody    Hepatitis B core antibody, total    Ceruloplasmin    Celiac Panel/Adult    Enhanced Liver Fibrosis (ELF) Score

## 2025-07-11 NOTE — ASSESSMENT & PLAN NOTE
See above plan  Orders:    Antimitochondrial antibody    Anti-smooth muscle antibody, IgG    Alpha 1 Antitrypsin Phenotype    Iron Panel (Includes Ferritin, Iron Sat%, Iron, and TIBC)    Hepatitis A antibody, total    Hepatitis B surface antibody    Hepatitis C antibody    Hepatitis B surface antigen    CBC    Enhanced Liver Fibrosis (ELF) Score    IgG, IgA, IgM    DAI Screen w/Reflex Cascade    Comprehensive metabolic panel    Hepatitis B surface antibody    Hepatitis B core antibody, total    Ceruloplasmin    Celiac Panel/Adult    Enhanced Liver Fibrosis (ELF) Score

## 2025-07-25 ENCOUNTER — RESULTS FOLLOW-UP (OUTPATIENT)
Age: 25
End: 2025-07-25

## 2025-07-25 LAB
25(OH)D3 SERPL-MCNC: 33 NG/ML (ref 30–100)
ALBUMIN SERPL-MCNC: 4.7 G/DL (ref 3.6–5.1)
ALBUMIN/GLOB SERPL: 1.7 (CALC) (ref 1–2.5)
ALP SERPL-CCNC: 75 U/L (ref 31–125)
ALT SERPL-CCNC: 62 U/L (ref 6–29)
AST SERPL-CCNC: 31 U/L (ref 10–30)
BILIRUB SERPL-MCNC: 0.3 MG/DL (ref 0.2–1.2)
BUN SERPL-MCNC: 11 MG/DL (ref 7–25)
BUN/CREAT SERPL: ABNORMAL (CALC) (ref 6–22)
CALCIUM SERPL-MCNC: 9.9 MG/DL (ref 8.6–10.2)
CHLORIDE SERPL-SCNC: 102 MMOL/L (ref 98–110)
CHOLEST SERPL-MCNC: 232 MG/DL
CHOLEST/HDLC SERPL: 5.5 (CALC)
CO2 SERPL-SCNC: 26 MMOL/L (ref 20–32)
CREAT SERPL-MCNC: 0.7 MG/DL (ref 0.5–0.96)
DHEA-S SERPL-MCNC: 423 MCG/DL (ref 14–349)
GFR/BSA.PRED SERPLBLD CYS-BASED-ARV: 124 ML/MIN/1.73M2
GLOBULIN SER CALC-MCNC: 2.8 G/DL (CALC) (ref 1.9–3.7)
GLUCOSE SERPL-MCNC: 91 MG/DL (ref 65–99)
HBA1C MFR BLD: 5.1 %
HDLC SERPL-MCNC: 42 MG/DL
INSULIN SERPL-ACNC: 18.6 UIU/ML
LDLC SERPL CALC-MCNC: 131 MG/DL (CALC)
NONHDLC SERPL-MCNC: 190 MG/DL (CALC)
POTASSIUM SERPL-SCNC: 3.8 MMOL/L (ref 3.5–5.3)
PROLACTIN SERPL-MCNC: 6.9 NG/ML
PROT SERPL-MCNC: 7.5 G/DL (ref 6.1–8.1)
SHBG SERPL-SCNC: 14 NMOL/L (ref 17–124)
SODIUM SERPL-SCNC: 139 MMOL/L (ref 135–146)
TRIGL SERPL-MCNC: 383 MG/DL
TSH SERPL-ACNC: 2.97 MIU/L

## 2025-07-27 LAB
ANA SER QL IF: NEGATIVE
BASOPHILS # BLD AUTO: 32 CELLS/UL (ref 0–200)
BASOPHILS NFR BLD AUTO: 0.6 %
CERULOPLASMIN SERPL-MCNC: 24 MG/DL (ref 14–48)
ENHANCED LIVER FIBROSIS (ELF) SCORE: 7.67
EOSINOPHIL # BLD AUTO: 70 CELLS/UL (ref 15–500)
EOSINOPHIL NFR BLD AUTO: 1.3 %
ERYTHROCYTE [DISTWIDTH] IN BLOOD BY AUTOMATED COUNT: 12.8 % (ref 11–15)
HAV AB SER QL IA: REACTIVE
HBV CORE AB SERPL QL IA: NORMAL
HBV SURFACE AB SER QL IA: NORMAL
HBV SURFACE AG SERPL QL IA: NORMAL
HCT VFR BLD AUTO: 46 % (ref 35–45)
HCV AB SERPL QL IA: NORMAL
HGB BLD-MCNC: 15.1 G/DL (ref 11.7–15.5)
IGA SERPL-MCNC: 216 MG/DL (ref 47–310)
IGG SERPL-MCNC: 1119 MG/DL (ref 600–1640)
IGM SERPL-MCNC: 122 MG/DL (ref 50–300)
LYMPHOCYTES # BLD AUTO: 1912 CELLS/UL (ref 850–3900)
LYMPHOCYTES NFR BLD AUTO: 35.4 %
MCH RBC QN AUTO: 30.9 PG (ref 27–33)
MCHC RBC AUTO-ENTMCNC: 32.8 G/DL (ref 32–36)
MCV RBC AUTO: 94.1 FL (ref 80–100)
MITOCHONDRIA AB SER QL IF: NEGATIVE
MONOCYTES # BLD AUTO: 292 CELLS/UL (ref 200–950)
MONOCYTES NFR BLD AUTO: 5.4 %
NEUTROPHILS # BLD AUTO: 3094 CELLS/UL (ref 1500–7800)
NEUTROPHILS NFR BLD AUTO: 57.3 %
PLATELET # BLD AUTO: 289 THOUSAND/UL (ref 140–400)
PMV BLD REES-ECKER: 10.2 FL (ref 7.5–12.5)
RBC # BLD AUTO: 4.89 MILLION/UL (ref 3.8–5.1)
SMOOTH MUSCLE AB SER QL IF: NEGATIVE
TTG IGA SER-ACNC: <1 U/ML
WBC # BLD AUTO: 5.4 THOUSAND/UL (ref 3.8–10.8)

## 2025-07-29 LAB
25(OH)D3 SERPL-MCNC: 33 NG/ML (ref 30–100)
ALBUMIN SERPL-MCNC: 4.7 G/DL (ref 3.6–5.1)
ALBUMIN/GLOB SERPL: 1.7 (CALC) (ref 1–2.5)
ALP SERPL-CCNC: 75 U/L (ref 31–125)
ALT SERPL-CCNC: 62 U/L (ref 6–29)
AST SERPL-CCNC: 31 U/L (ref 10–30)
BILIRUB SERPL-MCNC: 0.3 MG/DL (ref 0.2–1.2)
BUN SERPL-MCNC: 11 MG/DL (ref 7–25)
BUN/CREAT SERPL: ABNORMAL (CALC) (ref 6–22)
CALCIUM SERPL-MCNC: 9.9 MG/DL (ref 8.6–10.2)
CHLORIDE SERPL-SCNC: 102 MMOL/L (ref 98–110)
CHOLEST SERPL-MCNC: 232 MG/DL
CHOLEST/HDLC SERPL: 5.5 (CALC)
CO2 SERPL-SCNC: 26 MMOL/L (ref 20–32)
CREAT SERPL-MCNC: 0.7 MG/DL (ref 0.5–0.96)
DHEA-S SERPL-MCNC: 423 MCG/DL (ref 14–349)
GFR/BSA.PRED SERPLBLD CYS-BASED-ARV: 124 ML/MIN/1.73M2
GLOBULIN SER CALC-MCNC: 2.8 G/DL (CALC) (ref 1.9–3.7)
GLUCOSE SERPL-MCNC: 91 MG/DL (ref 65–99)
HBA1C MFR BLD: 5.1 %
HDLC SERPL-MCNC: 42 MG/DL
INSULIN SERPL-ACNC: 18.6 UIU/ML
LDLC SERPL CALC-MCNC: 131 MG/DL (CALC)
NONHDLC SERPL-MCNC: 190 MG/DL (CALC)
POTASSIUM SERPL-SCNC: 3.8 MMOL/L (ref 3.5–5.3)
PROLACTIN SERPL-MCNC: 6.9 NG/ML
PROT SERPL-MCNC: 7.5 G/DL (ref 6.1–8.1)
SHBG SERPL-SCNC: 14 NMOL/L (ref 17–124)
SODIUM SERPL-SCNC: 139 MMOL/L (ref 135–146)
TESTOST FREE SERPL-MCNC: 6.2 PG/ML (ref 0.1–6.4)
TESTOST SERPL-MCNC: 23 NG/DL (ref 2–45)
TRIGL SERPL-MCNC: 383 MG/DL
TSH SERPL-ACNC: 2.97 MIU/L

## 2025-07-30 LAB
A1AT PHENOTYP SERPL-IMP: NORMAL
ANA SER QL IF: NEGATIVE
BASOPHILS # BLD AUTO: 32 CELLS/UL (ref 0–200)
BASOPHILS NFR BLD AUTO: 0.6 %
CERULOPLASMIN SERPL-MCNC: 24 MG/DL (ref 14–48)
EOSINOPHIL # BLD AUTO: 70 CELLS/UL (ref 15–500)
EOSINOPHIL NFR BLD AUTO: 1.3 %
ERYTHROCYTE [DISTWIDTH] IN BLOOD BY AUTOMATED COUNT: 12.8 % (ref 11–15)
HAV AB SER QL IA: REACTIVE
HBV CORE AB SERPL QL IA: NORMAL
HBV SURFACE AB SER QL IA: NORMAL
HBV SURFACE AG SERPL QL IA: NORMAL
HCT VFR BLD AUTO: 46 % (ref 35–45)
HCV AB SERPL QL IA: NORMAL
HGB BLD-MCNC: 15.1 G/DL (ref 11.7–15.5)
IGA SERPL-MCNC: 216 MG/DL (ref 47–310)
IGG SERPL-MCNC: 1119 MG/DL (ref 600–1640)
IGM SERPL-MCNC: 122 MG/DL (ref 50–300)
LIVER FIBROSIS SCORE IN SERUM BY CALCULATED BY ELF: 7.67
LYMPHOCYTES # BLD AUTO: 1912 CELLS/UL (ref 850–3900)
LYMPHOCYTES NFR BLD AUTO: 35.4 %
MCH RBC QN AUTO: 30.9 PG (ref 27–33)
MCHC RBC AUTO-ENTMCNC: 32.8 G/DL (ref 32–36)
MCV RBC AUTO: 94.1 FL (ref 80–100)
MITOCHONDRIA AB SER QL IF: NEGATIVE
MONOCYTES # BLD AUTO: 292 CELLS/UL (ref 200–950)
MONOCYTES NFR BLD AUTO: 5.4 %
NEUTROPHILS # BLD AUTO: 3094 CELLS/UL (ref 1500–7800)
NEUTROPHILS NFR BLD AUTO: 57.3 %
PLATELET # BLD AUTO: 289 THOUSAND/UL (ref 140–400)
PMV BLD REES-ECKER: 10.2 FL (ref 7.5–12.5)
RBC # BLD AUTO: 4.89 MILLION/UL (ref 3.8–5.1)
SMOOTH MUSCLE AB SER QL IF: NEGATIVE
TTG IGA SER-ACNC: <1 U/ML
WBC # BLD AUTO: 5.4 THOUSAND/UL (ref 3.8–10.8)

## 2025-08-21 ENCOUNTER — TELEPHONE (OUTPATIENT)
Age: 25
End: 2025-08-21